# Patient Record
Sex: FEMALE | Race: WHITE | NOT HISPANIC OR LATINO
[De-identification: names, ages, dates, MRNs, and addresses within clinical notes are randomized per-mention and may not be internally consistent; named-entity substitution may affect disease eponyms.]

---

## 2018-12-23 ENCOUNTER — TRANSCRIPTION ENCOUNTER (OUTPATIENT)
Age: 68
End: 2018-12-23

## 2022-03-30 ENCOUNTER — NON-APPOINTMENT (OUTPATIENT)
Age: 72
End: 2022-03-30

## 2022-03-31 ENCOUNTER — APPOINTMENT (OUTPATIENT)
Dept: OPHTHALMOLOGY | Facility: CLINIC | Age: 72
End: 2022-03-31
Payer: COMMERCIAL

## 2022-03-31 PROCEDURE — 92004 COMPRE OPH EXAM NEW PT 1/>: CPT

## 2022-04-20 ENCOUNTER — APPOINTMENT (OUTPATIENT)
Dept: OPHTHALMOLOGY | Facility: CLINIC | Age: 72
End: 2022-04-20

## 2022-06-27 PROBLEM — Z00.00 ENCOUNTER FOR PREVENTIVE HEALTH EXAMINATION: Status: ACTIVE | Noted: 2022-06-27

## 2024-01-12 ENCOUNTER — NON-APPOINTMENT (OUTPATIENT)
Age: 74
End: 2024-01-12

## 2024-01-12 ENCOUNTER — APPOINTMENT (OUTPATIENT)
Dept: INFECTIOUS DISEASE | Facility: CLINIC | Age: 74
End: 2024-01-12
Payer: MEDICARE

## 2024-01-12 VITALS
WEIGHT: 195 LBS | BODY MASS INDEX: 36.82 KG/M2 | HEIGHT: 61 IN | HEART RATE: 68 BPM | DIASTOLIC BLOOD PRESSURE: 60 MMHG | SYSTOLIC BLOOD PRESSURE: 114 MMHG | TEMPERATURE: 97.7 F

## 2024-01-12 DIAGNOSIS — E78.5 HYPERLIPIDEMIA, UNSPECIFIED: ICD-10-CM

## 2024-01-12 DIAGNOSIS — Z95.0 PRESENCE OF CARDIAC PACEMAKER: ICD-10-CM

## 2024-01-12 DIAGNOSIS — Z86.73 PERSONAL HISTORY OF TRANSIENT ISCHEMIC ATTACK (TIA), AND CEREBRAL INFARCTION W/OUT RESIDUAL DEFICITS: ICD-10-CM

## 2024-01-12 PROCEDURE — 99214 OFFICE O/P EST MOD 30 MIN: CPT

## 2024-01-12 NOTE — REASON FOR VISIT
[FreeTextEntry1] : Patient here for Hospital follow up/[facility] for Sepsis. Patient was receiveing at infusion center at [facility] and went into septic shock. Patient had a series of infections through out November. Had uti and Cdiff. Completed Dificid. Patient discharged on Dec. 24 with a weeks worth of Dificid. After that went from constipation to diahrea. Since this past Monday has had straight diarhea.

## 2024-01-12 NOTE — HISTORY OF PRESENT ILLNESS
[FreeTextEntry1] : 74 Female with diarrhea, recent treatment for C. diff, volume depletion on Daily IVF per renal for F/U.  S/P [place] [facility]12/19 - 12/27 for treatment of UTI with Meropenem, culture negative, and C diff treated with dificid.    Finished course of Dificid 6 days ago and now C/O persistent watery diarrhea.  No abd pain no fever.  PMH  Treated Hepatitis C, XCKD,   Allergy vancomycin  UTD with immunizations  LABS  1/11/24:  WBC  7.91,  Hgb  12.9,  PLT  164,  BUN  29,  Creat  1.13,   K 3.6,

## 2024-01-12 NOTE — PHYSICAL EXAM
[General Appearance - Alert] : alert [General Appearance - In No Acute Distress] : in no acute distress [General Appearance - Well Nourished] : well nourished [General Appearance - Well-Appearing] : healthy appearing [FreeTextEntry1] : Right PICC line [Sclera] : the sclera and conjunctiva were normal [PERRL With Normal Accommodation] : pupils were equal in size, round, reactive to light [Extraocular Movements] : extraocular movements were intact [Outer Ear] : the ears and nose were normal in appearance [Hearing Threshold Finger Rub Not [place]] : hearing was normal [Examination Of The Oral Cavity] : the lips and gums were normal [Oropharynx] : the oropharynx was normal with no thrush [Neck Appearance] : the appearance of the neck was normal [Neck Cervical Mass (___cm)] : no neck mass was observed [Jugular Venous Distention Increased] : there was no jugular-venous distention [Thyroid Diffuse Enlargement] : the thyroid was not enlarged [Respiration, Rhythm And Depth] : normal respiratory rhythm and effort [Exaggerated Use Of Accessory Muscles For Inspiration] : no accessory muscle use [Auscultation Breath Sounds / Voice Sounds] : lungs were clear to auscultation bilaterally [Heart Rate And Rhythm] : heart rate was normal and rhythm regular [Heart Sounds] : normal S1 and S2 [Heart Sounds Gallop] : no gallops [Murmurs] : no murmurs [Heart Sounds Pericardial Friction Rub] : no pericardial rub [Full Pulse] : the pedal pulses are present [Edema] : there was no peripheral edema [Bowel Sounds] : normal bowel sounds [Abdomen Soft] : soft [Abdomen Tenderness] : non-tender [Abdomen Mass (___ Cm)] : no abdominal mass palpated [Costovertebral Angle Tenderness] : no CVA tenderness [No Palpable Adenopathy] : no palpable adenopathy [Cervical Lymph Nodes Enlarged Posterior Bilaterally] : posterior cervical [Cervical Lymph Nodes Enlarged Anterior Bilaterally] : anterior cervical [Musculoskeletal - Swelling] : no joint swelling [Range of Motion to Joints] : range of motion to joints [Nail Clubbing] : no clubbing  or cyanosis of the fingernails [Motor Tone] : muscle strength and tone were normal [Skin Color & Pigmentation] : normal skin color and pigmentation [] : no rash [Skin Lesions] : no skin lesions [Cranial Nerves] : cranial nerves 2-12 were intact [Sensation] : the sensory exam was normal to light touch and pinprick [Motor Exam] : the motor exam was normal [No Focal Deficits] : no focal deficits [Oriented To Time, Place, And Person] : oriented to person, place, and time [Affect] : the affect was normal

## 2024-01-12 NOTE — REVIEW OF SYSTEMS
[Fever] : no fever [Chills] : no chills [Body Aches] : no body aches [Difficulty Sleeping] : no difficulty sleeping [Feeling Tired] : feeling tired [Feeling Sick] : feeling sick [Normal Appetite] : appetite not normal  [Eye Pain] : no eye pain [Red Eyes] : eyes not red [Eyesight Problems] : no eyesight problems [Discharge From Eyes] : no purulent discharge from the eyes [Earache] : no earache [Loss Of Hearing] : no hearing loss [Nasal Discharge] : no nasal discharge [Sore Throat] : no sore throat [Hoarseness] : no hoarseness [Chest Pain] : no chest pain [Palpitations] : no palpitations [Leg Claudication] : no intermittent leg claudication [Lower Ext Edema] : no lower extremity edema [Shortness Of Breath] : no shortness of breath [Wheezing] : no wheezing [Cough] : no cough [SOB on Exertion] : no shortness of breath during exertion [Sputum] : not coughing up ~M sputum [Pleuritic Chest Pain] : no pleuritic chest pain [Abdominal Pain] : no abdominal pain [Vomiting] : no vomiting [Constipation] : no constipation [Diarrhea] : diarrhea [Dysuria] : no dysuria [Joint Pain] : no joint pain [Joint Swelling] : no joint swelling [Joint Stiffness] : no joint stiffness [Limb Pain] : no limb pain [Limb Swelling] : no limb swelling [Negative] : Heme/Lymph [FreeTextEntry2] : Decreased oral fluid intake, receiving daily IVF infusions at [facility] [FreeTextEntry5] : Pacemaker

## 2024-01-12 NOTE — ASSESSMENT
[FreeTextEntry1] : 74 Female S/P recent [facility] Hospitalization for UTI, Sepsis, and C. dif improving C/O Loose watery diarrhea Pt receiving daily IVF in [facility] per Nephrology.  Had normal CBC and CMP on 1/11/24.  PLAN:  1.  restart Dificid 100 mg PO q2h for 10 days              2.    contact Nephrology              3.  return 1 week [Treatment Education] : treatment education [Medical Care Issues] : medical care issues

## 2024-01-19 ENCOUNTER — APPOINTMENT (OUTPATIENT)
Dept: INFECTIOUS DISEASE | Facility: CLINIC | Age: 74
End: 2024-01-19
Payer: MEDICARE

## 2024-01-19 VITALS
SYSTOLIC BLOOD PRESSURE: 118 MMHG | HEART RATE: 88 BPM | DIASTOLIC BLOOD PRESSURE: 70 MMHG | TEMPERATURE: 97.9 F | OXYGEN SATURATION: 93 %

## 2024-01-19 DIAGNOSIS — N39.0 URINARY TRACT INFECTION, SITE NOT SPECIFIED: ICD-10-CM

## 2024-01-19 DIAGNOSIS — R19.7 DIARRHEA, UNSPECIFIED: ICD-10-CM

## 2024-01-19 PROCEDURE — 99214 OFFICE O/P EST MOD 30 MIN: CPT

## 2024-01-19 NOTE — HISTORY OF PRESENT ILLNESS
[FreeTextEntry1] : 74 Female with diarrhea, recent treatment for C. diff, volume depletion on Daily IVF per renal for F/U. S/P [place] [facility]12/19 - 12/27 for treatment of UTI with Meropenem, culture negative, and C diff treated with dificid. Nearly finished with current DIficid, has 3 more days.  Pt scheduled for CT abd 2/1 still receiving daily IVF at infusion center  PM Treated Hepatitis C, CKD,  Allergy vancomycin  UTD with immunizations  LABS 1/11/24: WBC 7.91, Hgb 12.9, , BUN 29, Creat 1.13, K 3.6  LABS  1/18/224:  Creat  1.0,  BUN  24,  WBC  7.7,  Hgb  13.7,m  PLT  132

## 2024-01-19 NOTE — ASSESSMENT
[FreeTextEntry1] : 74 Female with recurrent C. diff infection, improving, on dificid Pt received daily IVF via right PICC per Nephrology pt can drink normally  REC:  1.  Complete Dificid in 3 days            2.  have Pt   D/W Nephrology stopping daily IVF infusions and removing PICC line before the line gets infected            3.  Instructed pt to contact ID for recurrent diarrhea and need for stool sample for culture and C. diff if diarrhea returns

## 2024-01-19 NOTE — REVIEW OF SYSTEMS
[Negative] : Heme/Lymph [Fever] : no fever [Chills] : no chills [Body Aches] : no body aches [Difficulty Sleeping] : no difficulty sleeping [Feeling Tired] : not feeling tired [Feeling Sick] : not feeling sick [Normal Appetite] : appetite not normal  [Eye Pain] : no eye pain [Red Eyes] : eyes not red [Eyesight Problems] : no eyesight problems [Discharge From Eyes] : no purulent discharge from the eyes [Earache] : no earache [Loss Of Hearing] : no hearing loss [Nasal Discharge] : no nasal discharge [Sore Throat] : no sore throat [Hoarseness] : no hoarseness [Chest Pain] : no chest pain [Palpitations] : no palpitations [Leg Claudication] : no intermittent leg claudication [Lower Ext Edema] : no lower extremity edema [Shortness Of Breath] : no shortness of breath [Wheezing] : no wheezing [Cough] : no cough [SOB on Exertion] : no shortness of breath during exertion [Sputum] : not coughing up ~M sputum [Pleuritic Chest Pain] : no pleuritic chest pain [Abdominal Pain] : no abdominal pain [Vomiting] : no vomiting [Constipation] : no constipation [Diarrhea] : no diarrhea [Dysuria] : no dysuria

## 2024-01-19 NOTE — PHYSICAL EXAM
[General Appearance - Alert] : alert [General Appearance - In No Acute Distress] : in no acute distress [General Appearance - Well Nourished] : well nourished [General Appearance - Well-Appearing] : healthy appearing [Sclera] : the sclera and conjunctiva were normal [PERRL With Normal Accommodation] : pupils were equal in size, round, reactive to light [Extraocular Movements] : extraocular movements were intact [Outer Ear] : the ears and nose were normal in appearance [Hearing Threshold Finger Rub Not [place]] : hearing was normal [Examination Of The Oral Cavity] : the lips and gums were normal [Oropharynx] : the oropharynx was normal with no thrush [Neck Appearance] : the appearance of the neck was normal [Neck Cervical Mass (___cm)] : no neck mass was observed [Jugular Venous Distention Increased] : there was no jugular-venous distention [Thyroid Diffuse Enlargement] : the thyroid was not enlarged [Respiration, Rhythm And Depth] : normal respiratory rhythm and effort [Auscultation Breath Sounds / Voice Sounds] : lungs were clear to auscultation bilaterally [Exaggerated Use Of Accessory Muscles For Inspiration] : no accessory muscle use [Heart Rate And Rhythm] : heart rate was normal and rhythm regular [Heart Sounds] : normal S1 and S2 [Heart Sounds Gallop] : no gallops [Murmurs] : no murmurs [Heart Sounds Pericardial Friction Rub] : no pericardial rub [Full Pulse] : the pedal pulses are present [Edema] : there was no peripheral edema [Bowel Sounds] : normal bowel sounds [Abdomen Soft] : soft [Abdomen Tenderness] : non-tender [Abdomen Mass (___ Cm)] : no abdominal mass palpated [Costovertebral Angle Tenderness] : no CVA tenderness [No Palpable Adenopathy] : no palpable adenopathy [Cervical Lymph Nodes Enlarged Posterior Bilaterally] : posterior cervical [Cervical Lymph Nodes Enlarged Anterior Bilaterally] : anterior cervical [Musculoskeletal - Swelling] : no joint swelling [Range of Motion to Joints] : range of motion to joints [Nail Clubbing] : no clubbing  or cyanosis of the fingernails [Motor Tone] : muscle strength and tone were normal [Skin Color & Pigmentation] : normal skin color and pigmentation [Skin Lesions] : no skin lesions [] : no rash [Cranial Nerves] : cranial nerves 2-12 were intact [Sensation] : the sensory exam was normal to light touch and pinprick [Motor Exam] : the motor exam was normal [No Focal Deficits] : no focal deficits [Oriented To Time, Place, And Person] : oriented to person, place, and time [Affect] : the affect was normal

## 2024-01-19 NOTE — REASON FOR VISIT
[FreeTextEntry1] : Patient here for 1 week f/u for treatment of cdiff. Patient s/p Uti and sepsis. Currently on Difficid. Overall has gotten better but still has slight diarrhea. Has 3 more day's worth of Dificid at this point. Saw GI, Dr. Ivey last week and has been instructed to complete this round of Difficid, wait one week to see if still has diarhea and then get Cat scan of abd.

## 2024-01-26 PROBLEM — R19.7 DIARRHEA: Status: ACTIVE | Noted: 2024-01-26

## 2024-02-02 ENCOUNTER — APPOINTMENT (OUTPATIENT)
Dept: INFECTIOUS DISEASE | Facility: CLINIC | Age: 74
End: 2024-02-02

## 2024-02-05 ENCOUNTER — RESULT REVIEW (OUTPATIENT)
Age: 74
End: 2024-02-05

## 2024-02-22 ENCOUNTER — APPOINTMENT (OUTPATIENT)
Dept: INFECTIOUS DISEASE | Facility: CLINIC | Age: 74
End: 2024-02-22
Payer: MEDICARE

## 2024-02-22 VITALS
DIASTOLIC BLOOD PRESSURE: 70 MMHG | OXYGEN SATURATION: 98 % | HEIGHT: 61 IN | WEIGHT: 191 LBS | SYSTOLIC BLOOD PRESSURE: 118 MMHG | TEMPERATURE: 98.1 F | BODY MASS INDEX: 36.06 KG/M2 | HEART RATE: 78 BPM

## 2024-02-22 DIAGNOSIS — Z86.19 PERSONAL HISTORY OF OTHER INFECTIOUS AND PARASITIC DISEASES: ICD-10-CM

## 2024-02-22 DIAGNOSIS — N18.9 CHRONIC KIDNEY DISEASE, UNSPECIFIED: ICD-10-CM

## 2024-02-22 DIAGNOSIS — A49.8 OTHER BACTERIAL INFECTIONS OF UNSPECIFIED SITE: ICD-10-CM

## 2024-02-22 PROCEDURE — 99214 OFFICE O/P EST MOD 30 MIN: CPT

## 2024-02-22 RX ORDER — ROPINIROLE 1 MG/1
1 TABLET, FILM COATED ORAL
Refills: 0 | Status: ACTIVE | COMMUNITY

## 2024-02-22 RX ORDER — MICONAZOLE NITRATE 20 MG/G
2 CREAM VAGINAL
Refills: 0 | Status: ACTIVE | COMMUNITY

## 2024-02-22 RX ORDER — OMEPRAZOLE 40 MG/1
40 CAPSULE, DELAYED RELEASE ORAL
Refills: 0 | Status: ACTIVE | COMMUNITY

## 2024-02-22 RX ORDER — FIDAXOMICIN 200 MG/1
200 TABLET, FILM COATED ORAL
Refills: 0 | Status: ACTIVE | COMMUNITY

## 2024-02-22 RX ORDER — SUMATRIPTAN 100 MG/1
100 TABLET, FILM COATED ORAL
Refills: 0 | Status: ACTIVE | COMMUNITY

## 2024-02-22 RX ORDER — IPRATROPIUM BROMIDE 42 UG/1
SPRAY NASAL
Refills: 0 | Status: ACTIVE | COMMUNITY

## 2024-02-22 RX ORDER — BUDESONIDE 0.5 MG/2ML
0.5 INHALANT ORAL
Refills: 0 | Status: ACTIVE | COMMUNITY

## 2024-02-22 RX ORDER — ONDANSETRON 4 MG/1
4 TABLET, ORALLY DISINTEGRATING ORAL
Refills: 0 | Status: ACTIVE | COMMUNITY

## 2024-02-22 RX ORDER — GABAPENTIN 300 MG
300 TABLET ORAL
Refills: 0 | Status: ACTIVE | COMMUNITY

## 2024-02-22 RX ORDER — LEVALBUTEROL 1.25 MG/.5ML
1.25 SOLUTION, CONCENTRATE RESPIRATORY (INHALATION)
Refills: 0 | Status: ACTIVE | COMMUNITY

## 2024-02-22 RX ORDER — L. ACIDOPHILUS/L.BULGARICUS 1MM CELL
TABLET ORAL
Refills: 0 | Status: ACTIVE | COMMUNITY

## 2024-02-22 RX ORDER — PRAVASTATIN SODIUM 10 MG/1
10 TABLET ORAL
Refills: 0 | Status: ACTIVE | COMMUNITY

## 2024-02-22 RX ORDER — SACCHAROMYCES BOULARDII 50 MG
CAPSULE ORAL
Refills: 0 | Status: ACTIVE | COMMUNITY

## 2024-02-22 RX ORDER — TOPIRAMATE 50 MG
50 TABLET ORAL
Refills: 0 | Status: ACTIVE | COMMUNITY

## 2024-02-22 RX ORDER — ESCITALOPRAM OXALATE 5 MG/1
5 TABLET ORAL
Refills: 0 | Status: ACTIVE | COMMUNITY

## 2024-02-22 RX ORDER — MONTELUKAST 10 MG/1
10 TABLET, FILM COATED ORAL
Refills: 0 | Status: ACTIVE | COMMUNITY

## 2024-02-22 NOTE — REVIEW OF SYSTEMS
[Chills] : no chills [Fever] : no fever [Body Aches] : no body aches [Difficulty Sleeping] : no difficulty sleeping [Feeling Tired] : not feeling tired [Feeling Sick] : not feeling sick [Normal Appetite] : appetite not normal  [Eye Pain] : no eye pain [Red Eyes] : eyes not red [Eyesight Problems] : no eyesight problems [Discharge From Eyes] : no purulent discharge from the eyes [Earache] : no earache [Loss Of Hearing] : no hearing loss [Nasal Discharge] : no nasal discharge [Sore Throat] : no sore throat [Hoarseness] : no hoarseness [Chest Pain] : no chest pain [Palpitations] : no palpitations [Lower Ext Edema] : no lower extremity edema [Leg Claudication] : no intermittent leg claudication [Shortness Of Breath] : no shortness of breath [Wheezing] : no wheezing [Cough] : no cough [Sputum] : not coughing up ~M sputum [SOB on Exertion] : no shortness of breath during exertion [Pleuritic Chest Pain] : no pleuritic chest pain [Abdominal Pain] : no abdominal pain [Vomiting] : no vomiting [Constipation] : no constipation [Dysuria] : no dysuria [Negative] : Heme/Lymph [FreeTextEntry7] : resolving diarrhea

## 2024-02-22 NOTE — REASON FOR VISIT
[Post Hospitalization] : a post hospitalization visit [Family Member] : family member [FreeTextEntry1] : fu from hospitalization ([facility]) for bacteremia/C Diff pt completed IV Ceftriaxone continues taking Dificid pt saw PCP and GI (Dr. Ivey)

## 2024-02-22 NOTE — HISTORY OF PRESENT ILLNESS
[FreeTextEntry1] : 74 Female with chronic recurrent C. diff infections and UTI.  S/P Klebsiella bacteremia nd C. diff at [facility] last month.  Pt completed AB and has 1-2 days  Dificid left.  No abd pain, decreased diarrhea.  Chronic PICC Line has been removed.  Pt follows with GI who are managing her C. diff infections No longer requires daily IVF for hydration  PMH:   Ulcerative colitis had pouch for stool and normal urethra, had recent pouchitis  treated at [facility] with IVAB  LABS 1/27/24:  Stool Culture Neg Campylobacter, Salmonella, and Shigella.  C. diff negative

## 2024-02-22 NOTE — PHYSICAL EXAM
[General Appearance - Alert] : alert [General Appearance - In No Acute Distress] : in no acute distress [General Appearance - Well Nourished] : well nourished [General Appearance - Well-Appearing] : healthy appearing [Sclera] : the sclera and conjunctiva were normal [PERRL With Normal Accommodation] : pupils were equal in size, round, reactive to light [Extraocular Movements] : extraocular movements were intact [Outer Ear] : the ears and nose were normal in appearance [Hearing Threshold Finger Rub Not [place]] : hearing was normal [Examination Of The Oral Cavity] : the lips and gums were normal [Oropharynx] : the oropharynx was normal with no thrush [Neck Appearance] : the appearance of the neck was normal [Neck Cervical Mass (___cm)] : no neck mass was observed [Jugular Venous Distention Increased] : there was no jugular-venous distention [Thyroid Diffuse Enlargement] : the thyroid was not enlarged [Respiration, Rhythm And Depth] : normal respiratory rhythm and effort [Auscultation Breath Sounds / Voice Sounds] : lungs were clear to auscultation bilaterally [Exaggerated Use Of Accessory Muscles For Inspiration] : no accessory muscle use [Heart Rate And Rhythm] : heart rate was normal and rhythm regular [Heart Sounds] : normal S1 and S2 [Heart Sounds Gallop] : no gallops [Murmurs] : no murmurs [Full Pulse] : the pedal pulses are present [Heart Sounds Pericardial Friction Rub] : no pericardial rub [Edema] : there was no peripheral edema [Abdomen Soft] : soft [Bowel Sounds] : normal bowel sounds [Abdomen Tenderness] : non-tender [Abdomen Mass (___ Cm)] : no abdominal mass palpated [Costovertebral Angle Tenderness] : no CVA tenderness [No Palpable Adenopathy] : no palpable adenopathy [Cervical Lymph Nodes Enlarged Posterior Bilaterally] : posterior cervical [Cervical Lymph Nodes Enlarged Anterior Bilaterally] : anterior cervical [Femoral Lymph Nodes Enlarged Bilaterally] : femoral [Musculoskeletal - Swelling] : no joint swelling [Inguinal Lymph Nodes Enlarged Bilaterally] : inguinal [Nail Clubbing] : no clubbing  or cyanosis of the fingernails [Range of Motion to Joints] : range of motion to joints [Skin Color & Pigmentation] : normal skin color and pigmentation [Motor Tone] : muscle strength and tone were normal [] : no rash [Skin Lesions] : no skin lesions [Cranial Nerves] : cranial nerves 2-12 were intact [Sensation] : the sensory exam was normal to light touch and pinprick [Motor Exam] : the motor exam was normal [Oriented To Time, Place, And Person] : oriented to person, place, and time [No Focal Deficits] : no focal deficits [Affect] : the affect was normal

## 2024-02-22 NOTE — ASSESSMENT
[FreeTextEntry1] : 74 Female with Ulcerative colitis S/P colectomy and distal pouch, hx frequent bouts of C. diff S/P treated pouchitis and C. diff infection Pt is to complete course of DIficid in 2 days, continue probiotics , and follow up with GI Chronic PICC line for I Hydration has been removed.  REC:  1.  finish course of Dificid            2.  continue probiotics            3. F/U with GI a scheduled [Treatment Education] : treatment education [Medical Care Issues] : medical care issues

## 2025-02-27 ENCOUNTER — APPOINTMENT (OUTPATIENT)
Dept: UROGYNECOLOGY | Facility: CLINIC | Age: 75
End: 2025-02-27

## 2025-02-27 VITALS
WEIGHT: 195 LBS | HEIGHT: 59 IN | HEART RATE: 87 BPM | RESPIRATION RATE: 14 BRPM | DIASTOLIC BLOOD PRESSURE: 79 MMHG | SYSTOLIC BLOOD PRESSURE: 148 MMHG | BODY MASS INDEX: 39.31 KG/M2

## 2025-02-27 DIAGNOSIS — N95.2 POSTMENOPAUSAL ATROPHIC VAGINITIS: ICD-10-CM

## 2025-02-27 DIAGNOSIS — R35.1 NOCTURIA: ICD-10-CM

## 2025-02-27 DIAGNOSIS — N39.0 URINARY TRACT INFECTION, SITE NOT SPECIFIED: ICD-10-CM

## 2025-02-27 DIAGNOSIS — R35.0 FREQUENCY OF MICTURITION: ICD-10-CM

## 2025-02-27 PROCEDURE — 99204 OFFICE O/P NEW MOD 45 MIN: CPT

## 2025-02-27 PROCEDURE — 99459 PELVIC EXAMINATION: CPT

## 2025-03-20 ENCOUNTER — APPOINTMENT (OUTPATIENT)
Dept: OBGYN | Facility: CLINIC | Age: 75
End: 2025-03-20
Payer: MEDICARE

## 2025-03-20 VITALS
WEIGHT: 195 LBS | DIASTOLIC BLOOD PRESSURE: 86 MMHG | BODY MASS INDEX: 39.31 KG/M2 | SYSTOLIC BLOOD PRESSURE: 140 MMHG | HEIGHT: 59 IN

## 2025-03-20 DIAGNOSIS — N89.8 OTHER SPECIFIED NONINFLAMMATORY DISORDERS OF VAGINA: ICD-10-CM

## 2025-03-20 DIAGNOSIS — Z85.828 PERSONAL HISTORY OF OTHER MALIGNANT NEOPLASM OF SKIN: ICD-10-CM

## 2025-03-20 DIAGNOSIS — K51.919 ULCERATIVE COLITIS, UNSPECIFIED WITH UNSPECIFIED COMPLICATIONS: ICD-10-CM

## 2025-03-20 PROCEDURE — 99214 OFFICE O/P EST MOD 30 MIN: CPT

## 2025-03-20 PROCEDURE — 99459 PELVIC EXAMINATION: CPT

## 2025-05-30 ENCOUNTER — APPOINTMENT (OUTPATIENT)
Dept: UROLOGY | Facility: CLINIC | Age: 75
End: 2025-05-30
Payer: MEDICARE

## 2025-05-30 ENCOUNTER — NON-APPOINTMENT (OUTPATIENT)
Age: 75
End: 2025-05-30

## 2025-05-30 VITALS
WEIGHT: 220 LBS | HEIGHT: 59 IN | DIASTOLIC BLOOD PRESSURE: 58 MMHG | SYSTOLIC BLOOD PRESSURE: 121 MMHG | BODY MASS INDEX: 44.35 KG/M2

## 2025-05-30 DIAGNOSIS — N95.2 POSTMENOPAUSAL ATROPHIC VAGINITIS: ICD-10-CM

## 2025-05-30 DIAGNOSIS — R35.0 FREQUENCY OF MICTURITION: ICD-10-CM

## 2025-05-30 DIAGNOSIS — Z78.9 OTHER SPECIFIED HEALTH STATUS: ICD-10-CM

## 2025-05-30 PROCEDURE — 99204 OFFICE O/P NEW MOD 45 MIN: CPT

## 2025-05-30 RX ORDER — ESTRADIOL 0.1 MG/G
0.1 CREAM VAGINAL
Qty: 3 | Refills: 3 | Status: ACTIVE | COMMUNITY
Start: 2025-05-30 | End: 1900-01-01

## 2025-06-12 ENCOUNTER — APPOINTMENT (OUTPATIENT)
Dept: UROLOGY | Facility: CLINIC | Age: 75
End: 2025-06-12